# Patient Record
Sex: FEMALE | Race: WHITE | NOT HISPANIC OR LATINO
[De-identification: names, ages, dates, MRNs, and addresses within clinical notes are randomized per-mention and may not be internally consistent; named-entity substitution may affect disease eponyms.]

---

## 2018-10-25 ENCOUNTER — APPOINTMENT (OUTPATIENT)
Dept: BREAST CENTER | Facility: CLINIC | Age: 43
End: 2018-10-25
Payer: COMMERCIAL

## 2018-10-25 VITALS
HEIGHT: 65 IN | BODY MASS INDEX: 24.49 KG/M2 | SYSTOLIC BLOOD PRESSURE: 120 MMHG | WEIGHT: 147 LBS | HEART RATE: 72 BPM | OXYGEN SATURATION: 99 % | RESPIRATION RATE: 14 BRPM | DIASTOLIC BLOOD PRESSURE: 82 MMHG

## 2018-10-25 DIAGNOSIS — M94.0 CHONDROCOSTAL JUNCTION SYNDROME [TIETZE]: ICD-10-CM

## 2018-10-25 DIAGNOSIS — Z80.3 FAMILY HISTORY OF MALIGNANT NEOPLASM OF BREAST: ICD-10-CM

## 2018-10-25 DIAGNOSIS — Z80.9 FAMILY HISTORY OF MALIGNANT NEOPLASM, UNSPECIFIED: ICD-10-CM

## 2018-10-25 DIAGNOSIS — Z78.9 OTHER SPECIFIED HEALTH STATUS: ICD-10-CM

## 2018-10-25 DIAGNOSIS — E04.2 NONTOXIC MULTINODULAR GOITER: ICD-10-CM

## 2018-10-25 DIAGNOSIS — Z87.891 PERSONAL HISTORY OF NICOTINE DEPENDENCE: ICD-10-CM

## 2018-10-25 DIAGNOSIS — Z86.39 PERSONAL HISTORY OF OTHER ENDOCRINE, NUTRITIONAL AND METABOLIC DISEASE: ICD-10-CM

## 2018-10-25 DIAGNOSIS — Z82.49 FAMILY HISTORY OF ISCHEMIC HEART DISEASE AND OTHER DISEASES OF THE CIRCULATORY SYSTEM: ICD-10-CM

## 2018-10-25 PROCEDURE — 99243 OFF/OP CNSLTJ NEW/EST LOW 30: CPT

## 2018-10-25 RX ORDER — LEVOTHYROXINE SODIUM 125 UG/1
125 CAPSULE ORAL
Refills: 0 | Status: ACTIVE | COMMUNITY

## 2019-04-25 ENCOUNTER — APPOINTMENT (OUTPATIENT)
Dept: BREAST CENTER | Facility: CLINIC | Age: 44
End: 2019-04-25
Payer: COMMERCIAL

## 2019-04-25 VITALS
BODY MASS INDEX: 24.16 KG/M2 | HEART RATE: 69 BPM | WEIGHT: 145 LBS | DIASTOLIC BLOOD PRESSURE: 76 MMHG | SYSTOLIC BLOOD PRESSURE: 109 MMHG | HEIGHT: 65 IN

## 2019-04-25 DIAGNOSIS — N64.4 MASTODYNIA: ICD-10-CM

## 2019-04-25 DIAGNOSIS — Z12.31 ENCOUNTER FOR SCREENING MAMMOGRAM FOR MALIGNANT NEOPLASM OF BREAST: ICD-10-CM

## 2019-04-25 DIAGNOSIS — R92.2 INCONCLUSIVE MAMMOGRAM: ICD-10-CM

## 2019-04-25 PROCEDURE — 99214 OFFICE O/P EST MOD 30 MIN: CPT

## 2019-04-25 RX ORDER — IBUPROFEN 800 MG/1
800 TABLET, FILM COATED ORAL
Qty: 15 | Refills: 0 | Status: DISCONTINUED | COMMUNITY
Start: 2019-04-11

## 2019-04-25 RX ORDER — ALBUTEROL SULFATE 90 UG/1
108 (90 BASE) AEROSOL, METERED RESPIRATORY (INHALATION)
Qty: 8 | Refills: 0 | Status: DISCONTINUED | COMMUNITY
Start: 2018-12-17

## 2019-04-25 RX ORDER — AZITHROMYCIN 250 MG/1
250 TABLET, FILM COATED ORAL
Qty: 6 | Refills: 0 | Status: DISCONTINUED | COMMUNITY
Start: 2018-12-17

## 2019-04-25 RX ORDER — ONDANSETRON 4 MG/1
4 TABLET ORAL
Qty: 2 | Refills: 0 | Status: DISCONTINUED | COMMUNITY
Start: 2019-04-11

## 2019-04-25 RX ORDER — NEOMYCIN AND POLYMYXIN B SULFATES AND HYDROCORTISONE OTIC 10; 3.5; 1 MG/ML; MG/ML; [USP'U]/ML
3.5-10000-1 SUSPENSION AURICULAR (OTIC)
Qty: 10 | Refills: 0 | Status: DISCONTINUED | COMMUNITY
Start: 2018-12-17

## 2019-04-25 RX ORDER — METHYLPREDNISOLONE 4 MG/1
4 TABLET ORAL
Qty: 21 | Refills: 0 | Status: DISCONTINUED | COMMUNITY
Start: 2018-12-17

## 2019-04-25 NOTE — PHYSICAL EXAM
[Normocephalic] : normocephalic [Atraumatic] : atraumatic [EOMI] : extra ocular movement intact [Sclera nonicteric] : sclera nonicteric [Supple] : supple [No Supraclavicular Adenopathy] : no supraclavicular adenopathy [No Cervical Adenopathy] : no cervical adenopathy [Clear to Auscultation Bilat] : clear to auscultation bilaterally [No Rubs] : no pericardial rub [Normal Sinus Rhythm] : normal sinus rhythm [Asymmetrical] : asymmetrical [Examined in the supine and seated position] : examined in the supine and seated position [Bra Size: ___] : Bra Size: [unfilled] [No dominant masses] : no dominant masses in right breast  [No dominant masses] : no dominant masses left breast [No Nipple Retraction] : no left nipple retraction [No Nipple Discharge] : no left nipple discharge [No Axillary Lymphadenopathy] : no left axillary lymphadenopathy [Soft] : abdomen soft [Not Tender] : non-tender [No Edema] : no edema [No Rashes] : no rashes [No Ulceration] : no ulceration [de-identified] : L slightly > R

## 2019-04-25 NOTE — PAST MEDICAL HISTORY
[Menstruating] : The patient is menstruating [Menarche Age ____] : age at menarche was [unfilled] [Irregular Cycle Intervals] : are  irregular [Total Preg ___] : G[unfilled] [Age At Live Birth ___] : Age at live birth: [unfilled] [Live Births ___] : P[unfilled]  [FreeTextEntry8] : 3 yr

## 2019-04-25 NOTE — CONSULT LETTER
[Dear  ___] : Dear  [unfilled], [Courtesy Letter:] : I had the pleasure of seeing your patient, [unfilled], in my office today. [Please see my note below.] : Please see my note below. [Consult Closing:] : Thank you very much for allowing me to participate in the care of this patient.  If you have any questions, please do not hesitate to contact me. [Sincerely,] : Sincerely, [FreeTextEntry3] : Niki Olivera MD  [DrDominique  ___] : Dr. NOVA

## 2019-04-25 NOTE — HISTORY OF PRESENT ILLNESS
[FreeTextEntry1] : Ms. Pruitt is a 43 year old woman here for a follow-up for right breast and axillary pain. Her breast history is notable for a benign needle biopsy in the right breast. The pain started in 2017, comes and goes, is dull and deep, in the right upper breast and radiates to the axilla. Since the last office visit, the pain still comes and goes, although not as severe as before. She has tried ibuprofen which leads to resolution of the pain. No pain today. Her menses were usually irregular in the past. She just lost a baby 2 weeks ago.\par \par Her genetic panel testing is negative. Her family history is significant for breast cancer in a paternal aunt in her late 40's. There is also a history of cancer, possibly ovarian, in her mother at 51.

## 2019-04-25 NOTE — DATA REVIEWED
[FreeTextEntry1] : [facility] panel testing 12/12/2016\par - no mutations\par \par B/l mammogram 8/10/2018\par - heterogenously dense\par - BIRADS 1\par \par B/l complete US 8/10/2018\par - 0.7 cm intramammary node in R breast 10:00, stable\par - BIRADS 1

## 2025-05-29 ENCOUNTER — OFFICE (OUTPATIENT)
Dept: URBAN - METROPOLITAN AREA CLINIC 100 | Facility: CLINIC | Age: 50
Setting detail: OPHTHALMOLOGY
End: 2025-05-29
Payer: COMMERCIAL

## 2025-05-29 DIAGNOSIS — H01.004: ICD-10-CM

## 2025-05-29 DIAGNOSIS — H01.001: ICD-10-CM

## 2025-05-29 DIAGNOSIS — H00.12: ICD-10-CM

## 2025-05-29 DIAGNOSIS — H01.002: ICD-10-CM

## 2025-05-29 PROBLEM — H52.7 REFRACTIVE ERROR ; BOTH EYES: Status: ACTIVE | Noted: 2025-05-29

## 2025-05-29 PROCEDURE — 99203 OFFICE O/P NEW LOW 30 MIN: CPT | Performed by: OPHTHALMOLOGY

## 2025-05-29 ASSESSMENT — LID EXAM ASSESSMENTS
OS_BLEPHARITIS: LUL 1+
OD_BLEPHARITIS: RUL 1+

## 2025-05-29 ASSESSMENT — CONFRONTATIONAL VISUAL FIELD TEST (CVF)
OD_FINDINGS: FULL
OS_FINDINGS: FULL

## 2025-05-29 ASSESSMENT — REFRACTION_CURRENTRX
OS_VPRISM_DIRECTION: SV
OD_VPRISM_DIRECTION: SV
OD_SPHERE: -0.50
OD_OVR_VA: 20/
OD_CYLINDER: SPH
OS_CYLINDER: -0.50
OS_OVR_VA: 20/
OS_SPHERE: -0.25
OS_AXIS: 099

## 2025-05-29 ASSESSMENT — REFRACTION_AUTOREFRACTION
OD_CYLINDER: -0.75
OS_SPHERE: PLANO
OD_SPHERE: PLANO
OS_CYLINDER: -0.75
OS_AXIS: 092
OD_AXIS: 089

## 2025-05-29 ASSESSMENT — REFRACTION_MANIFEST
OD_AXIS: 105
OD_SPHERE: -0.25
OS_SPHERE: PLANO
OD_SPHERE: -0.25
OS_VA1: 20/20
OS_AXIS: 100
OS_SPHERE: -0.50
OD_AXIS: 105
OS_AXIS: 102
OD_VA1: 20/20
OD_ADD: +1.50
OS_CYLINDER: -0.50
OS_ADD: +1.50
OD_CYLINDER: -0.25
OD_CYLINDER: -0.25
OS_CYLINDER: -0.25

## 2025-05-29 ASSESSMENT — KERATOMETRY
OD_K2POWER_DIOPTERS: 45.25
OS_AXISANGLE_DEGREES: 092
OD_AXISANGLE_DEGREES: 130
OS_K2POWER_DIOPTERS: 44.50
METHOD_AUTO_MANUAL: AUTO
OD_K1POWER_DIOPTERS: 43.50
OS_K1POWER_DIOPTERS: 44.00

## 2025-05-29 ASSESSMENT — TEAR BREAK UP TIME (TBUT)
OD_TBUT: 1+
OS_TBUT: 1+

## 2025-05-29 ASSESSMENT — VISUAL ACUITY
OD_BCVA: 20/20-
OS_BCVA: 20/20

## 2025-05-29 ASSESSMENT — TONOMETRY: OD_IOP_MMHG: 12
